# Patient Record
Sex: FEMALE | Race: WHITE | NOT HISPANIC OR LATINO
[De-identification: names, ages, dates, MRNs, and addresses within clinical notes are randomized per-mention and may not be internally consistent; named-entity substitution may affect disease eponyms.]

---

## 2021-09-03 ENCOUNTER — TRANSCRIPTION ENCOUNTER (OUTPATIENT)
Age: 51
End: 2021-09-03

## 2021-09-03 ENCOUNTER — EMERGENCY (EMERGENCY)
Facility: HOSPITAL | Age: 51
LOS: 0 days | Discharge: HOME | End: 2021-09-03
Attending: EMERGENCY MEDICINE | Admitting: EMERGENCY MEDICINE
Payer: COMMERCIAL

## 2021-09-03 VITALS
SYSTOLIC BLOOD PRESSURE: 136 MMHG | WEIGHT: 179.9 LBS | HEART RATE: 96 BPM | DIASTOLIC BLOOD PRESSURE: 94 MMHG | HEIGHT: 64 IN | TEMPERATURE: 101 F | RESPIRATION RATE: 18 BRPM | OXYGEN SATURATION: 93 %

## 2021-09-03 VITALS
SYSTOLIC BLOOD PRESSURE: 132 MMHG | OXYGEN SATURATION: 98 % | RESPIRATION RATE: 18 BRPM | HEART RATE: 76 BPM | DIASTOLIC BLOOD PRESSURE: 74 MMHG | TEMPERATURE: 98 F

## 2021-09-03 DIAGNOSIS — R50.9 FEVER, UNSPECIFIED: ICD-10-CM

## 2021-09-03 DIAGNOSIS — U07.1 COVID-19: ICD-10-CM

## 2021-09-03 DIAGNOSIS — E11.9 TYPE 2 DIABETES MELLITUS WITHOUT COMPLICATIONS: ICD-10-CM

## 2021-09-03 DIAGNOSIS — E78.00 PURE HYPERCHOLESTEROLEMIA, UNSPECIFIED: ICD-10-CM

## 2021-09-03 DIAGNOSIS — Z88.0 ALLERGY STATUS TO PENICILLIN: ICD-10-CM

## 2021-09-03 DIAGNOSIS — R07.89 OTHER CHEST PAIN: ICD-10-CM

## 2021-09-03 DIAGNOSIS — I10 ESSENTIAL (PRIMARY) HYPERTENSION: ICD-10-CM

## 2021-09-03 DIAGNOSIS — Z88.8 ALLERGY STATUS TO OTHER DRUGS, MEDICAMENTS AND BIOLOGICAL SUBSTANCES: ICD-10-CM

## 2021-09-03 LAB
ALBUMIN SERPL ELPH-MCNC: 4.4 G/DL — SIGNIFICANT CHANGE UP (ref 3.5–5.2)
ALP SERPL-CCNC: 79 U/L — SIGNIFICANT CHANGE UP (ref 30–115)
ALT FLD-CCNC: 58 U/L — HIGH (ref 0–41)
ANION GAP SERPL CALC-SCNC: 12 MMOL/L — SIGNIFICANT CHANGE UP (ref 7–14)
APTT BLD: 30.6 SEC — SIGNIFICANT CHANGE UP (ref 27–39.2)
AST SERPL-CCNC: 51 U/L — HIGH (ref 0–41)
BASOPHILS # BLD AUTO: 0.02 K/UL — SIGNIFICANT CHANGE UP (ref 0–0.2)
BASOPHILS NFR BLD AUTO: 0.4 % — SIGNIFICANT CHANGE UP (ref 0–1)
BILIRUB SERPL-MCNC: 0.3 MG/DL — SIGNIFICANT CHANGE UP (ref 0.2–1.2)
BUN SERPL-MCNC: 12 MG/DL — SIGNIFICANT CHANGE UP (ref 10–20)
CALCIUM SERPL-MCNC: 9.5 MG/DL — SIGNIFICANT CHANGE UP (ref 8.5–10.1)
CHLORIDE SERPL-SCNC: 101 MMOL/L — SIGNIFICANT CHANGE UP (ref 98–110)
CO2 SERPL-SCNC: 25 MMOL/L — SIGNIFICANT CHANGE UP (ref 17–32)
CREAT SERPL-MCNC: 1 MG/DL — SIGNIFICANT CHANGE UP (ref 0.7–1.5)
D DIMER BLD IA.RAPID-MCNC: 534 NG/ML DDU — HIGH (ref 0–230)
EOSINOPHIL # BLD AUTO: 0.03 K/UL — SIGNIFICANT CHANGE UP (ref 0–0.7)
EOSINOPHIL NFR BLD AUTO: 0.6 % — SIGNIFICANT CHANGE UP (ref 0–8)
GLUCOSE SERPL-MCNC: 151 MG/DL — HIGH (ref 70–99)
HCG SERPL QL: NEGATIVE — SIGNIFICANT CHANGE UP
HCT VFR BLD CALC: 41.8 % — SIGNIFICANT CHANGE UP (ref 37–47)
HGB BLD-MCNC: 13.8 G/DL — SIGNIFICANT CHANGE UP (ref 12–16)
IMM GRANULOCYTES NFR BLD AUTO: 0.6 % — HIGH (ref 0.1–0.3)
INR BLD: 1.01 RATIO — SIGNIFICANT CHANGE UP (ref 0.65–1.3)
LYMPHOCYTES # BLD AUTO: 1.25 K/UL — SIGNIFICANT CHANGE UP (ref 1.2–3.4)
LYMPHOCYTES # BLD AUTO: 23.1 % — SIGNIFICANT CHANGE UP (ref 20.5–51.1)
MAGNESIUM SERPL-MCNC: 1.9 MG/DL — SIGNIFICANT CHANGE UP (ref 1.8–2.4)
MCHC RBC-ENTMCNC: 28.5 PG — SIGNIFICANT CHANGE UP (ref 27–31)
MCHC RBC-ENTMCNC: 33 G/DL — SIGNIFICANT CHANGE UP (ref 32–37)
MCV RBC AUTO: 86.4 FL — SIGNIFICANT CHANGE UP (ref 81–99)
MONOCYTES # BLD AUTO: 0.8 K/UL — HIGH (ref 0.1–0.6)
MONOCYTES NFR BLD AUTO: 14.8 % — HIGH (ref 1.7–9.3)
NEUTROPHILS # BLD AUTO: 3.27 K/UL — SIGNIFICANT CHANGE UP (ref 1.4–6.5)
NEUTROPHILS NFR BLD AUTO: 60.5 % — SIGNIFICANT CHANGE UP (ref 42.2–75.2)
NRBC # BLD: 0 /100 WBCS — SIGNIFICANT CHANGE UP (ref 0–0)
PLATELET # BLD AUTO: 219 K/UL — SIGNIFICANT CHANGE UP (ref 130–400)
POTASSIUM SERPL-MCNC: 4.1 MMOL/L — SIGNIFICANT CHANGE UP (ref 3.5–5)
POTASSIUM SERPL-SCNC: 4.1 MMOL/L — SIGNIFICANT CHANGE UP (ref 3.5–5)
PROT SERPL-MCNC: 6.5 G/DL — SIGNIFICANT CHANGE UP (ref 6–8)
PROTHROM AB SERPL-ACNC: 11.6 SEC — SIGNIFICANT CHANGE UP (ref 9.95–12.87)
RBC # BLD: 4.84 M/UL — SIGNIFICANT CHANGE UP (ref 4.2–5.4)
RBC # FLD: 13.1 % — SIGNIFICANT CHANGE UP (ref 11.5–14.5)
SARS-COV-2 RNA SPEC QL NAA+PROBE: DETECTED
SODIUM SERPL-SCNC: 138 MMOL/L — SIGNIFICANT CHANGE UP (ref 135–146)
TROPONIN T SERPL-MCNC: <0.01 NG/ML — SIGNIFICANT CHANGE UP
WBC # BLD: 5.4 K/UL — SIGNIFICANT CHANGE UP (ref 4.8–10.8)
WBC # FLD AUTO: 5.4 K/UL — SIGNIFICANT CHANGE UP (ref 4.8–10.8)

## 2021-09-03 PROCEDURE — 71045 X-RAY EXAM CHEST 1 VIEW: CPT | Mod: 26

## 2021-09-03 PROCEDURE — 71275 CT ANGIOGRAPHY CHEST: CPT | Mod: 26,MA

## 2021-09-03 PROCEDURE — 93010 ELECTROCARDIOGRAM REPORT: CPT

## 2021-09-03 PROCEDURE — 99285 EMERGENCY DEPT VISIT HI MDM: CPT

## 2021-09-03 RX ORDER — SITAGLIPTIN 50 MG/1
1 TABLET, FILM COATED ORAL
Qty: 20 | Refills: 0
Start: 2021-09-03 | End: 2021-09-22

## 2021-09-03 RX ORDER — METOPROLOL TARTRATE 50 MG
1 TABLET ORAL
Qty: 20 | Refills: 0
Start: 2021-09-03 | End: 2021-09-22

## 2021-09-03 RX ORDER — METFORMIN HYDROCHLORIDE 850 MG/1
1 TABLET ORAL
Qty: 40 | Refills: 0
Start: 2021-09-03 | End: 2021-09-22

## 2021-09-03 RX ORDER — VALSARTAN 80 MG/1
1 TABLET ORAL
Qty: 20 | Refills: 0
Start: 2021-09-03 | End: 2021-09-22

## 2021-09-03 RX ORDER — ATORVASTATIN CALCIUM 80 MG/1
1 TABLET, FILM COATED ORAL
Qty: 20 | Refills: 0
Start: 2021-09-03 | End: 2021-09-22

## 2021-09-03 RX ORDER — ACETAMINOPHEN 500 MG
975 TABLET ORAL ONCE
Refills: 0 | Status: COMPLETED | OUTPATIENT
Start: 2021-09-03 | End: 2021-09-03

## 2021-09-03 RX ORDER — ASPIRIN/CALCIUM CARB/MAGNESIUM 324 MG
1 TABLET ORAL
Qty: 20 | Refills: 0
Start: 2021-09-03 | End: 2021-09-22

## 2021-09-03 RX ADMIN — Medication 975 MILLIGRAM(S): at 20:26

## 2021-09-03 NOTE — ED PROVIDER NOTE - TOBACCO USE
Never smoker Airway patent. Nasal mucosa clear. Mouth with normal mucosa. Throat has no vesicles, no oropharyngeal exudates and uvula is midline.

## 2021-09-03 NOTE — ED PROVIDER NOTE - NSFOLLOWUPINSTRUCTIONS_ED_ALL_ED_FT
You have tested POSITIVE for the novel coronavirus (COVID-19). Upon discharge, you must self-quarantine for 14 days, or until the Department of Health contacts you. Please wear a face mask if you are around other individuals. Try to avoid contact with house members, family, and friends for the duration of this quarantine. Please follow up with your primary care physician within 2-3 weeks of your discharge from [facility]. Please take all medications as prescribed. If you experience any worsening or recurrence of your symptoms, particularly worsening or high fever, shortness of breathe, extreme fatigue, or bloody cough please call [phone] immediately or report to the nearest Emergency Department. If you have any questions or concerns, please do not hesitate to call the hospital at [phone].

## 2021-09-03 NOTE — ED PROVIDER NOTE - OBJECTIVE STATEMENT
Patient c/o covid + low grade fever with worsen BASURTO, Sx started today , + left sided chest pain, H/o DM, HTN.

## 2021-09-03 NOTE — ED PROVIDER NOTE - CARE PROVIDER_API CALL
Raúl Claire)  [facility]  [address] [place] [address]  Phone: [phone]  Fax: [phone]  Follow Up Time:

## 2021-09-03 NOTE — ED PROVIDER NOTE - PATIENT PORTAL LINK FT
You can access the FollowMyHealth Patient Portal offered by [facility] by registering at the following website: [email or web address]. By joining [facility]’s FollowMyHealth portal, you will also be able to view your health information using other applications (apps) compatible with our system.

## 2021-09-04 ENCOUNTER — OUTPATIENT (OUTPATIENT)
Dept: INPATIENT UNIT | Facility: HOSPITAL | Age: 51
LOS: 1 days | Discharge: HOME | End: 2021-09-04

## 2021-09-04 ENCOUNTER — APPOINTMENT (OUTPATIENT)
Age: 51
End: 2021-09-04

## 2021-09-04 VITALS
OXYGEN SATURATION: 98 % | DIASTOLIC BLOOD PRESSURE: 85 MMHG | RESPIRATION RATE: 18 BRPM | HEART RATE: 83 BPM | SYSTOLIC BLOOD PRESSURE: 125 MMHG | TEMPERATURE: 98 F

## 2021-09-04 VITALS
RESPIRATION RATE: 17 BRPM | OXYGEN SATURATION: 97 % | HEART RATE: 71 BPM | DIASTOLIC BLOOD PRESSURE: 77 MMHG | SYSTOLIC BLOOD PRESSURE: 116 MMHG | TEMPERATURE: 98 F

## 2021-09-04 DIAGNOSIS — U07.1 COVID-19: ICD-10-CM

## 2021-09-04 LAB
CRP SERPL-MCNC: 5 MG/L — HIGH
FERRITIN SERPL-MCNC: 70 NG/ML — SIGNIFICANT CHANGE UP (ref 15–150)
PROCALCITONIN SERPL-MCNC: 0.06 NG/ML — SIGNIFICANT CHANGE UP (ref 0.02–0.1)

## 2021-09-04 RX ORDER — SODIUM CHLORIDE 9 MG/ML
250 INJECTION INTRAMUSCULAR; INTRAVENOUS; SUBCUTANEOUS
Refills: 0 | Status: COMPLETED | OUTPATIENT
Start: 2021-09-04 | End: 2021-09-04

## 2021-09-04 RX ADMIN — SODIUM CHLORIDE 25 MILLILITER(S): 9 INJECTION INTRAMUSCULAR; INTRAVENOUS; SUBCUTANEOUS at 14:46

## 2021-09-04 NOTE — CHART NOTE - PRIOR COVID TREATMENT
51 y.o. female pmh HTN, DM, HLD, presenting for MAB infusion. symptoms of chills, BASURTO with long walks, and myalgias began on Wednesday. Tested negative against covid on thursday (returned on flight from [place]). Returned to  - tested positive. No cp, sob, cough, pleurisy, dizziness, hx dvt, pe, calf pain swelling , nausea, vomiting ,abdominal pain.      Constitutional: (-) fever (+) chill (-) lightheadedness (+) myalgias  Eyes/ENT: (-) blurry vision, (-) epistaxis (-) rhinorrhea (-) nasal congestion  Cardiovascular: (-) chest pain, (-) syncope (-) palpitations   Respiratory: (-) cough, (-) shortness of breath (-) pleurisy (+)BASURTO  Gastrointestinal: (-) vomiting, (-) diarrhea (-) abdominal pain (-) nausea (-) anorexia  Musculoskeletal: (-) neck pain, (-) back pain, (-) joint pain (-) joint swelling (-) painful ROM  Integumentary: (-) rash, (-) edema (-) lacerations (-) pruritis   Neurological: (-) headache, (-) altered mental status (-) LOC (-) dizziness (-) paresthesias (-) gait abnormalities   Psychiatric: (-) hallucinations (-) SI (-) HI  Allergic/Immunologic: (-) pruritus      Physical Exam    Vital Signs: I have reviewed the initial vital signs.  Constitutional: well-nourished, appears stated age, no acute distress  Eyes: Conjunctiva pink, Sclera clear,  Cardiovascular: S1 and S2, regular rate,well-perfused extremities, radial pulses equal and 2+, calves nonttp, equal in size  Respiratory: unlabored respiratory effort, speaking in full sentences, handling oral secretions, 98% on RA , clear to auscultation bilaterally no wheezing, rales and rhonchi  Integumentary: warm, dry, no rashes, lacerations,  Neurologic: awake, alert,    progress: VSS post infusion. PT denies symptoms at this time. Spirometer and folder of post procedural information given.

## 2021-09-06 ENCOUNTER — TRANSCRIPTION ENCOUNTER (OUTPATIENT)
Age: 51
End: 2021-09-06

## 2021-09-07 ENCOUNTER — TRANSCRIPTION ENCOUNTER (OUTPATIENT)
Age: 51
End: 2021-09-07

## 2021-09-07 ENCOUNTER — APPOINTMENT (OUTPATIENT)
Dept: AFTER HOURS CARE | Facility: EMERGENCY ROOM | Age: 51
End: 2021-09-07
Payer: COMMERCIAL

## 2021-09-07 DIAGNOSIS — R19.7 DIARRHEA, UNSPECIFIED: ICD-10-CM

## 2021-09-07 DIAGNOSIS — U07.1 COVID-19: ICD-10-CM

## 2021-09-07 PROBLEM — Z00.00 ENCOUNTER FOR PREVENTIVE HEALTH EXAMINATION: Status: ACTIVE | Noted: 2021-09-07

## 2021-09-07 PROCEDURE — 99203 OFFICE O/P NEW LOW 30 MIN: CPT | Mod: 95

## 2021-09-08 PROBLEM — R19.7 DIARRHEA: Status: ACTIVE | Noted: 2021-09-08

## 2021-09-08 PROBLEM — E11.9 TYPE 2 DIABETES MELLITUS WITHOUT COMPLICATIONS: Chronic | Status: ACTIVE | Noted: 2021-09-03

## 2021-09-08 PROBLEM — I10 ESSENTIAL (PRIMARY) HYPERTENSION: Chronic | Status: ACTIVE | Noted: 2021-09-03

## 2021-09-08 PROBLEM — E78.00 PURE HYPERCHOLESTEROLEMIA, UNSPECIFIED: Chronic | Status: ACTIVE | Noted: 2021-09-03

## 2021-09-08 PROBLEM — U07.1 COVID-19: Status: ACTIVE | Noted: 2021-09-08

## 2021-09-15 NOTE — ASSESSMENT
[FreeTextEntry1] : pt with diarrhea in setting of COVID, no red flag symptoms, pt well appearing, sx improved from past few days.\par Recommend pt increase electrolyte intake. Discussed red flag symptoms including: inability to tolerate PO, inability\par to replenish electrolyte losses, chest pain, shortness of breath with increased work of breathing, hypoxia, chest pain

## 2021-09-15 NOTE — PHYSICAL EXAM
[No Acute Distress] : no acute distress [Well Nourished] : well nourished [Normal Sclera/Conjunctiva] : normal sclera/conjunctiva [EOMI] : extraocular movements intact [Normal Outer Ear/Nose] : the outer ears and nose were normal in appearance [Normal Oropharynx] : the oropharynx was normal [Supple] : supple [No Respiratory Distress] : no respiratory distress  [Soft] : abdomen soft [Non Tender] : non-tender [Coordination Grossly Intact] : coordination grossly intact [Non-distended] : non-distended [Normal Affect] : the affect was normal [Normal Insight/Judgement] : insight and judgment were intact [de-identified] : Pulse Ox > 98%, HR 91, NAD, no increased WOB, speaking in full sentences, afebrile- 98.4

## 2021-09-15 NOTE — REVIEW OF SYSTEMS
[Fever] : no fever [Chills] : no chills [Fatigue] : fatigue [Chest Pain] : no chest pain [Cough] : cough [Abdominal Pain] : no abdominal pain [Nausea] : no nausea [Vomiting] : no vomiting

## 2021-09-15 NOTE — HISTORY OF PRESENT ILLNESS
[Home] : at home, [unfilled] , at the time of the visit. [Other Location: e.g. Home (Enter Location, City,State)___] : at [unfilled] [Verbal consent obtained from patient] : the patient, [unfilled] [FreeTextEntry8] : [email or web address]

## 2021-09-15 NOTE — HISTORY OF PRESENT ILLNESS
[Home] : at home, [unfilled] , at the time of the visit. [Other Location: e.g. Home (Enter Location, City,State)___] : at [unfilled] [Verbal consent obtained from patient] : the patient, [unfilled] [FreeTextEntry8] : [email or web address]

## 2021-09-15 NOTE — PHYSICAL EXAM
[No Acute Distress] : no acute distress [Well Nourished] : well nourished [Normal Sclera/Conjunctiva] : normal sclera/conjunctiva [EOMI] : extraocular movements intact [Normal Outer Ear/Nose] : the outer ears and nose were normal in appearance [Normal Oropharynx] : the oropharynx was normal [Supple] : supple [No Respiratory Distress] : no respiratory distress  [Soft] : abdomen soft [Non Tender] : non-tender [Coordination Grossly Intact] : coordination grossly intact [Non-distended] : non-distended [Normal Affect] : the affect was normal [Normal Insight/Judgement] : insight and judgment were intact [de-identified] : Pulse Ox > 98%, HR 91, NAD, no increased WOB, speaking in full sentences, afebrile- 98.4

## 2023-07-31 NOTE — ED ADULT NURSE NOTE - CAS DISCH BELONGINGS RETURNED
Not applicable Otezla Pregnancy And Lactation Text: This medication is Pregnancy Category C and it isn't known if it is safe during pregnancy. It is unknown if it is excreted in breast milk.

## 2024-02-16 NOTE — ED ADULT NURSE NOTE - HOW OFTEN DO YOU HAVE A DRINK CONTAINING ALCOHOL?
February 16, 2024      [facility]  [facility] [facility] [address]  Phone: [phone]       Patient: Bryn Garza   YOB: 2022  Date of Visit: 02/16/2024    To Whom It May Concern:    Nehemais Garza  was at [facility] on 02/16/2024. The patient may return to work/school on 2/17/24 with no restrictions. If you have any questions or concerns, or if I can be of further assistance, please do not hesitate to contact me.    Sincerely,    ELI Thakkar      Monthly or less
